# Patient Record
Sex: MALE | Race: WHITE | NOT HISPANIC OR LATINO | Employment: OTHER | URBAN - METROPOLITAN AREA
[De-identification: names, ages, dates, MRNs, and addresses within clinical notes are randomized per-mention and may not be internally consistent; named-entity substitution may affect disease eponyms.]

---

## 2023-02-13 PROBLEM — G89.29 CHRONIC BILATERAL LOW BACK PAIN: Status: ACTIVE | Noted: 2023-02-13

## 2023-02-13 PROBLEM — M17.11 PRIMARY OSTEOARTHRITIS OF RIGHT KNEE: Status: ACTIVE | Noted: 2023-02-13

## 2023-02-13 PROBLEM — G89.29 CHRONIC RIGHT SHOULDER PAIN: Status: RESOLVED | Noted: 2023-02-13 | Resolved: 2023-02-13

## 2023-02-13 PROBLEM — M17.12 PRIMARY OSTEOARTHRITIS OF LEFT KNEE: Status: RESOLVED | Noted: 2023-02-13 | Resolved: 2023-02-13

## 2023-02-13 PROBLEM — E78.5 BORDERLINE HYPERLIPIDEMIA: Status: ACTIVE | Noted: 2023-02-13

## 2023-02-13 PROBLEM — E53.8 VITAMIN B 12 DEFICIENCY: Status: ACTIVE | Noted: 2023-02-13

## 2023-02-13 PROBLEM — M76.899 ENTHESOPATHY OF HIP REGION: Status: ACTIVE | Noted: 2023-02-13

## 2023-02-13 PROBLEM — M16.0 PRIMARY OSTEOARTHRITIS OF BOTH HIPS: Status: ACTIVE | Noted: 2023-02-13

## 2023-02-13 PROBLEM — M54.50 CHRONIC BILATERAL LOW BACK PAIN: Status: ACTIVE | Noted: 2023-02-13

## 2023-02-13 PROBLEM — R10.31 RIGHT GROIN PAIN: Status: ACTIVE | Noted: 2023-02-13

## 2023-02-13 PROBLEM — I10 HYPERTENSION: Status: ACTIVE | Noted: 2023-02-13

## 2023-02-13 PROBLEM — M25.511 CHRONIC RIGHT SHOULDER PAIN: Status: RESOLVED | Noted: 2023-02-13 | Resolved: 2023-02-13

## 2023-02-13 PROBLEM — R73.03 PREDIABETES: Status: ACTIVE | Noted: 2023-02-13

## 2023-04-04 ENCOUNTER — OFFICE VISIT (OUTPATIENT)
Dept: PRIMARY CARE | Facility: CLINIC | Age: 78
End: 2023-04-04
Payer: MEDICARE

## 2023-04-04 VITALS
HEART RATE: 55 BPM | DIASTOLIC BLOOD PRESSURE: 66 MMHG | TEMPERATURE: 96.8 F | WEIGHT: 246 LBS | HEIGHT: 69 IN | OXYGEN SATURATION: 98 % | BODY MASS INDEX: 36.43 KG/M2 | SYSTOLIC BLOOD PRESSURE: 102 MMHG

## 2023-04-04 DIAGNOSIS — E66.01 CLASS 2 SEVERE OBESITY DUE TO EXCESS CALORIES WITH SERIOUS COMORBIDITY AND BODY MASS INDEX (BMI) OF 36.0 TO 36.9 IN ADULT (MULTI): ICD-10-CM

## 2023-04-04 DIAGNOSIS — Z00.00 MEDICARE ANNUAL WELLNESS VISIT, SUBSEQUENT: Primary | ICD-10-CM

## 2023-04-04 DIAGNOSIS — E78.5 BORDERLINE HYPERLIPIDEMIA: ICD-10-CM

## 2023-04-04 DIAGNOSIS — I10 PRIMARY HYPERTENSION: ICD-10-CM

## 2023-04-04 DIAGNOSIS — R73.03 PREDIABETES: ICD-10-CM

## 2023-04-04 DIAGNOSIS — M17.11 PRIMARY OSTEOARTHRITIS OF RIGHT KNEE: ICD-10-CM

## 2023-04-04 DIAGNOSIS — E53.8 VITAMIN B 12 DEFICIENCY: ICD-10-CM

## 2023-04-04 PROBLEM — E66.812 CLASS 2 SEVERE OBESITY DUE TO EXCESS CALORIES WITH SERIOUS COMORBIDITY AND BODY MASS INDEX (BMI) OF 36.0 TO 36.9 IN ADULT: Status: ACTIVE | Noted: 2023-04-04

## 2023-04-04 PROCEDURE — 1036F TOBACCO NON-USER: CPT | Performed by: FAMILY MEDICINE

## 2023-04-04 PROCEDURE — 1160F RVW MEDS BY RX/DR IN RCRD: CPT | Performed by: FAMILY MEDICINE

## 2023-04-04 PROCEDURE — 1170F FXNL STATUS ASSESSED: CPT | Performed by: FAMILY MEDICINE

## 2023-04-04 PROCEDURE — 1159F MED LIST DOCD IN RCRD: CPT | Performed by: FAMILY MEDICINE

## 2023-04-04 PROCEDURE — 99213 OFFICE O/P EST LOW 20 MIN: CPT | Performed by: FAMILY MEDICINE

## 2023-04-04 PROCEDURE — 3078F DIAST BP <80 MM HG: CPT | Performed by: FAMILY MEDICINE

## 2023-04-04 PROCEDURE — G0439 PPPS, SUBSEQ VISIT: HCPCS | Performed by: FAMILY MEDICINE

## 2023-04-04 PROCEDURE — 3074F SYST BP LT 130 MM HG: CPT | Performed by: FAMILY MEDICINE

## 2023-04-04 RX ORDER — RAMIPRIL 10 MG/1
10 CAPSULE ORAL DAILY
Qty: 90 CAPSULE | Refills: 3 | Status: SHIPPED | OUTPATIENT
Start: 2023-04-04 | End: 2024-02-22 | Stop reason: SDUPTHER

## 2023-04-04 RX ORDER — METOPROLOL TARTRATE 25 MG/1
25 TABLET, FILM COATED ORAL 2 TIMES DAILY
Qty: 180 TABLET | Refills: 3 | Status: SHIPPED | OUTPATIENT
Start: 2023-04-04 | End: 2023-10-02 | Stop reason: ALTCHOICE

## 2023-04-04 ASSESSMENT — ACTIVITIES OF DAILY LIVING (ADL)
DOING_HOUSEWORK: INDEPENDENT
TAKING_MEDICATION: INDEPENDENT
DRESSING: INDEPENDENT
MANAGING_FINANCES: INDEPENDENT
BATHING: INDEPENDENT
GROCERY_SHOPPING: INDEPENDENT

## 2023-04-04 ASSESSMENT — PATIENT HEALTH QUESTIONNAIRE - PHQ9
1. LITTLE INTEREST OR PLEASURE IN DOING THINGS: NOT AT ALL
SUM OF ALL RESPONSES TO PHQ9 QUESTIONS 1 AND 2: 0
2. FEELING DOWN, DEPRESSED OR HOPELESS: NOT AT ALL

## 2023-04-04 NOTE — PROGRESS NOTES
"Subjective   Reason for Visit: Abdirahman Mcdaneils is an 77 y.o. male here for a Medicare Wellness visit.     Past Medical, Surgical, and Family History reviewed and updated in chart.    Reviewed all medications by prescribing practitioner or clinical pharmacist (such as prescriptions, OTCs, herbal therapies and supplements) and documented in the medical record.    HPI  Reviewed chronic medical conditions    Patient Care Team:  Eduard Melchor DO as PCP - General  Eduard Melchor DO as PCP - [employer] PCP     Review of Systems    Objective   Vitals:  /66   Pulse 55   Temp 36 °C (96.8 °F)   Ht 1.753 m (5' 9\")   Wt 112 kg (246 lb)   SpO2 98%   BMI 36.33 kg/m²       Physical Exam  General: Patient is alert and oriented ×3 and appears in no acute distress. No respiratory distress. Obese    Head: Atraumatic normocephalic.    Eyes: EOMI, PERRLA      Ears: Canals patent without any irritation, tympanic membranes without inflammation, no swelling, normal light reflex.    Neck: Normal range of motion, no masses.  Thyroid is palpable and normal in size without any nodules. No anterior cervical or posterior cervical adenopathy.    Heart: Regular rate and rhythm, no murmurs clicks or gallops.  Dependent edema bilaterally    Lungs: Clear to auscultation bilaterally without any rhonchi rales or wheezing, lung sounds heard throughout all lung fields    Abdomen: Soft, nontender, no rigidity, rebound, guarding or organomegaly. Bowel sounds ×4 quadrants.    Musculoskeletal: Normal range of motion, strength is grossly intact in the proximal distal muscles of the upper and lower extremities bilaterally, deep tendon reflexes +2 out of 4 and symmetric bilaterally at the patella, Achilles, biceps, triceps, sensation intact.    Nerves: Cranial nerves II through XII appear grossly intact and without deficit    Skin: Intact, dry, no rashes or erythema    Psych: Normal affect.     Assessment/Plan   Problem List Items " Addressed This Visit       Borderline hyperlipidemia    Hypertension    Relevant Medications    metoprolol tartrate (Lopressor) 25 mg tablet    ramipril (Altace) 10 mg capsule    Prediabetes    Primary osteoarthritis of right knee    Vitamin B 12 deficiency    Medicare annual wellness visit, subsequent - Primary    Class 2 severe obesity due to excess calories with serious comorbidity and body mass index (BMI) of 36.0 to 36.9 in adult (CMS/[facility])     Reviewed in for the patient's past medical history, surgical history, family history, social history  Depression screening was done in the office today using PHQ-2  We reviewed the patient's activities of daily living and possible risk for falling. Patient is stable.  Discussed preventative screenings  Vaccinations were discussed in the office. We did review the patient's status on influenza vaccination, pneumonia vaccination, tetanus vaccination, and vaccinations are up-to-date   Patient was instructed to follow-up with dentist regularly and also with eye doctor regularly  We discussed advanced directives including power of , living well and DO NOT RESUSCITATE orders    Essential hypertension-currently controlled. continue on decreased metoprolol 25 mg 1 by mouth twice a day and ramipril 10 mg 1 by mouth daily.    Hyperlipidemia-currently controlled. continue on pravastatin 80 mg daily    Prediabetes-suggested diet and exercise at this point in time. Repeat sugars in 6 months    Osteoarthritis of the hips bilaterally-patient has seen Dr. Duenas the orthopedist.  Does not want knee replacements    Vitamin B-12 deficiency-the patient is taking B-12 regularly.    Morbid obesity-discussed diet and exercise with the patient. Suggested that he start regular exercise program. Not execising     **Patient Discussion/Summary    Risk Factors Identified During Visit: Weight: BMI < 18.5 or > 25.   Weight Concerns: provided diet and exercise counseling.   Influenza: influenza  vaccine was previously given.   Pneumovax 23: Pneumovax 23 vaccine was given  Prevnar 13: Prevnar 13 vaccine was previously given.   Shingles Vaccine: Shingles vaccine was previously given.   Prostate cancer screening: screening not indicated.   Colorectal Cancer Screening: screening not indicated.   Abdominal Aortic Aneurysm screening: screening not indicated.   HIV screening: screening not indicated.   Hip pain left  Continue taking celecoxib 200 mg 1 capsule twice daily and start taking Tylenol 500-1,000 mg up to 3 times a day. Just make sure to stay under 3000 mg daily.  Can go to Dr Pierson if no improvement

## 2023-04-17 ENCOUNTER — TELEPHONE (OUTPATIENT)
Dept: PRIMARY CARE | Facility: CLINIC | Age: 78
End: 2023-04-17
Payer: MEDICARE

## 2023-04-17 DIAGNOSIS — I10 PRIMARY HYPERTENSION: Primary | ICD-10-CM

## 2023-04-17 NOTE — TELEPHONE ENCOUNTER
Pt let message that since you have made med changed he has swelling in his feet and ankles. Asking for your suggestions

## 2023-04-18 RX ORDER — METOPROLOL TARTRATE 50 MG/1
50 TABLET ORAL 2 TIMES DAILY
Qty: 180 TABLET | Refills: 3 | Status: SHIPPED | OUTPATIENT
Start: 2023-04-18 | End: 2024-04-17

## 2023-04-18 NOTE — TELEPHONE ENCOUNTER
Called Abdirahman and he said ever since you changed Metroprolol from 50% to 25%  BID that his feet started swelling. He is good with everything else no other symptoms .

## 2023-04-19 NOTE — TELEPHONE ENCOUNTER
Called Abdirahman and left another message to see if he got my first message and asked for a call back

## 2023-05-01 ENCOUNTER — APPOINTMENT (OUTPATIENT)
Dept: PRIMARY CARE | Facility: CLINIC | Age: 78
End: 2023-05-01
Payer: MEDICARE

## 2023-05-09 ENCOUNTER — OFFICE VISIT (OUTPATIENT)
Dept: PRIMARY CARE | Facility: CLINIC | Age: 78
End: 2023-05-09
Payer: MEDICARE

## 2023-05-09 VITALS
BODY MASS INDEX: 36.29 KG/M2 | HEART RATE: 55 BPM | TEMPERATURE: 97.1 F | HEIGHT: 69 IN | DIASTOLIC BLOOD PRESSURE: 60 MMHG | SYSTOLIC BLOOD PRESSURE: 108 MMHG | OXYGEN SATURATION: 96 % | WEIGHT: 245 LBS

## 2023-05-09 DIAGNOSIS — R06.09 DYSPNEA ON EXERTION: ICD-10-CM

## 2023-05-09 DIAGNOSIS — R60.0 BILATERAL LOWER EXTREMITY EDEMA: Primary | ICD-10-CM

## 2023-05-09 DIAGNOSIS — S81.802A OPEN WOUND OF LEFT LOWER EXTREMITY, INITIAL ENCOUNTER: ICD-10-CM

## 2023-05-09 DIAGNOSIS — I87.2 VENOUS STASIS DERMATITIS OF BOTH LOWER EXTREMITIES: ICD-10-CM

## 2023-05-09 PROCEDURE — 3078F DIAST BP <80 MM HG: CPT | Performed by: FAMILY MEDICINE

## 2023-05-09 PROCEDURE — 99214 OFFICE O/P EST MOD 30 MIN: CPT | Performed by: FAMILY MEDICINE

## 2023-05-09 PROCEDURE — 3074F SYST BP LT 130 MM HG: CPT | Performed by: FAMILY MEDICINE

## 2023-05-09 PROCEDURE — 1159F MED LIST DOCD IN RCRD: CPT | Performed by: FAMILY MEDICINE

## 2023-05-09 PROCEDURE — 1160F RVW MEDS BY RX/DR IN RCRD: CPT | Performed by: FAMILY MEDICINE

## 2023-05-09 PROCEDURE — 1036F TOBACCO NON-USER: CPT | Performed by: FAMILY MEDICINE

## 2023-05-09 ASSESSMENT — PATIENT HEALTH QUESTIONNAIRE - PHQ9
2. FEELING DOWN, DEPRESSED OR HOPELESS: NOT AT ALL
SUM OF ALL RESPONSES TO PHQ9 QUESTIONS 1 AND 2: 0
1. LITTLE INTEREST OR PLEASURE IN DOING THINGS: NOT AT ALL

## 2023-05-09 NOTE — PROGRESS NOTES
Subjective   Patient ID: Abdirahman Mcdaniels is a 77 y.o. male who presents for Foot Swelling (Feet swelling for at least couple months. Has a puncture wound on back of left leg. ).  HPI      Patient has  2 lat 1 x 1  1x 1.25 l3ft leg.  At least 1 year has had the cuts on the legs.  Hit on a car door.      Having swelling andnvaricose veins and changes in coloration.    Swelling for the past couple months  Denies SOB, Denies CP.     Eyes are getting matted up.    No fatigue or tired        Review of Systems    Objective   Physical Exam  General: Patient is alert and orient x3 and appears in no acute distress.  Morbidly obese.    Eyes: EOMI, PERRLA    Neck: No JVD, normal range of motion, no adenopathy    Heart: Regular rate and rhythm no murmurs clicks or gallops    Lungs: Clear to auscultation bilaterally without rhonchi rales or wheezing    Extremities: Pitting edema plus 2 out of 4, discoloration of the lower extremities dusky in color, are not erythematous, no drainage, granulation tissue is present, no bleeding      Assessment/Plan   Problem List Items Addressed This Visit    None  Bilateral lower extremity edema  - Ordering echocardiogram since the patient also does have some mild shortness of breath with exertion  -Labs ordered  - It looks like the patient is dealing with venous stasis disease.  - Discussed using compression stockings  -Instructed to eat low-salt diet which the patient is already doing    Open wounds left lower extremity laterally  - Sent to wound care.  Not healing because of venous stasis disease.  Does not appear to be infected at this time.

## 2023-06-12 DIAGNOSIS — E78.5 BORDERLINE HYPERLIPIDEMIA: ICD-10-CM

## 2023-06-12 DIAGNOSIS — I10 PRIMARY HYPERTENSION: Primary | ICD-10-CM

## 2023-06-12 RX ORDER — PRAVASTATIN SODIUM 80 MG/1
80 TABLET ORAL NIGHTLY
Qty: 90 TABLET | Refills: 3 | Status: SHIPPED | OUTPATIENT
Start: 2023-06-12 | End: 2023-07-13 | Stop reason: SDUPTHER

## 2023-07-13 DIAGNOSIS — E78.5 BORDERLINE HYPERLIPIDEMIA: ICD-10-CM

## 2023-07-13 DIAGNOSIS — I10 PRIMARY HYPERTENSION: ICD-10-CM

## 2023-07-13 RX ORDER — PRAVASTATIN SODIUM 80 MG/1
80 TABLET ORAL NIGHTLY
Qty: 90 TABLET | Refills: 3 | Status: SHIPPED | OUTPATIENT
Start: 2023-07-13 | End: 2023-08-02 | Stop reason: SDUPTHER

## 2023-08-02 DIAGNOSIS — I10 PRIMARY HYPERTENSION: ICD-10-CM

## 2023-08-02 DIAGNOSIS — E78.5 BORDERLINE HYPERLIPIDEMIA: ICD-10-CM

## 2023-08-02 RX ORDER — PRAVASTATIN SODIUM 80 MG/1
80 TABLET ORAL NIGHTLY
Qty: 90 TABLET | Refills: 3 | Status: SHIPPED | OUTPATIENT
Start: 2023-08-02 | End: 2024-08-01

## 2023-08-16 DIAGNOSIS — M17.11 PRIMARY OSTEOARTHRITIS OF RIGHT KNEE: Primary | ICD-10-CM

## 2023-08-16 RX ORDER — CELECOXIB 200 MG/1
200 CAPSULE ORAL 2 TIMES DAILY
Qty: 180 CAPSULE | Refills: 3 | Status: SHIPPED | OUTPATIENT
Start: 2023-08-16 | End: 2024-08-15

## 2023-09-14 ENCOUNTER — TELEPHONE (OUTPATIENT)
Dept: PRIMARY CARE | Facility: CLINIC | Age: 78
End: 2023-09-14
Payer: MEDICARE

## 2023-10-02 ENCOUNTER — OFFICE VISIT (OUTPATIENT)
Dept: PRIMARY CARE | Facility: CLINIC | Age: 78
End: 2023-10-02
Payer: MEDICARE

## 2023-10-02 VITALS
HEIGHT: 69 IN | OXYGEN SATURATION: 98 % | WEIGHT: 245 LBS | DIASTOLIC BLOOD PRESSURE: 76 MMHG | HEART RATE: 72 BPM | SYSTOLIC BLOOD PRESSURE: 112 MMHG | BODY MASS INDEX: 36.29 KG/M2 | TEMPERATURE: 97.2 F

## 2023-10-02 DIAGNOSIS — I10 PRIMARY HYPERTENSION: ICD-10-CM

## 2023-10-02 DIAGNOSIS — R73.03 PREDIABETES: ICD-10-CM

## 2023-10-02 DIAGNOSIS — E53.8 VITAMIN B 12 DEFICIENCY: ICD-10-CM

## 2023-10-02 DIAGNOSIS — E66.01 CLASS 2 SEVERE OBESITY DUE TO EXCESS CALORIES WITH SERIOUS COMORBIDITY AND BODY MASS INDEX (BMI) OF 36.0 TO 36.9 IN ADULT (MULTI): ICD-10-CM

## 2023-10-02 DIAGNOSIS — Z00.00 ANNUAL PHYSICAL EXAM: Primary | ICD-10-CM

## 2023-10-02 DIAGNOSIS — E78.5 BORDERLINE HYPERLIPIDEMIA: ICD-10-CM

## 2023-10-02 PROCEDURE — 1160F RVW MEDS BY RX/DR IN RCRD: CPT | Performed by: FAMILY MEDICINE

## 2023-10-02 PROCEDURE — 3074F SYST BP LT 130 MM HG: CPT | Performed by: FAMILY MEDICINE

## 2023-10-02 PROCEDURE — G0008 ADMIN INFLUENZA VIRUS VAC: HCPCS | Performed by: FAMILY MEDICINE

## 2023-10-02 PROCEDURE — 99214 OFFICE O/P EST MOD 30 MIN: CPT | Performed by: FAMILY MEDICINE

## 2023-10-02 PROCEDURE — 90662 IIV NO PRSV INCREASED AG IM: CPT | Performed by: FAMILY MEDICINE

## 2023-10-02 PROCEDURE — 1036F TOBACCO NON-USER: CPT | Performed by: FAMILY MEDICINE

## 2023-10-02 PROCEDURE — 99397 PER PM REEVAL EST PAT 65+ YR: CPT | Performed by: FAMILY MEDICINE

## 2023-10-02 PROCEDURE — 1159F MED LIST DOCD IN RCRD: CPT | Performed by: FAMILY MEDICINE

## 2023-10-02 PROCEDURE — 3078F DIAST BP <80 MM HG: CPT | Performed by: FAMILY MEDICINE

## 2023-10-02 ASSESSMENT — PATIENT HEALTH QUESTIONNAIRE - PHQ9
2. FEELING DOWN, DEPRESSED OR HOPELESS: NOT AT ALL
1. LITTLE INTEREST OR PLEASURE IN DOING THINGS: NOT AT ALL
SUM OF ALL RESPONSES TO PHQ9 QUESTIONS 1 AND 2: 0

## 2023-10-02 NOTE — PROGRESS NOTES
"Subjective   Reason for Visit: Abdirahman Mcdaniels is an 77 y.o. male here for a Annual physical and to review chronic illnesses  Need flu vaccine and HBA1C    Past Medical, Surgical, and Family History reviewed and updated in chart.    Reviewed all medications by prescribing practitioner or clinical pharmacist (such as prescriptions, OTCs, herbal therapies and supplements) and documented in the medical record.    HPI  Reviewed chronic medical conditions    Diet: He is not following any paticular exercise  Exercise: trying to walk some  Sleep:  He is sleeping well  Denies urinary difficulty    Dental: No issues and regular  Vision:  No problems   Hearing is decreased some    Saw Dr Lentz saw him.  Still following with wound care and for toe nails.    He is wearing compression stockings.    Patient Care Team:  Eduard Melchor DO as PCP - General  Eduard Melchor DO as PCP - [employer] PCP     Review of Systems    Objective   Vitals:  /76   Pulse 72   Temp 36.2 °C (97.2 °F)   Ht 1.753 m (5' 9\")   Wt 111 kg (245 lb)   SpO2 98%   BMI 36.18 kg/m²       Physical Exam  General: Patient is alert and oriented ×3 and appears in no acute distress. No respiratory distress. Obese    Head: Atraumatic normocephalic.    Eyes: EOMI, PERRLA      Ears: Canals patent without any irritation, tympanic membranes without inflammation, no swelling, normal light reflex.    Neck: Normal range of motion, no masses.  Thyroid is palpable and normal in size without any nodules. No anterior cervical or posterior cervical adenopathy.    Heart: Regular rate and rhythm, no murmurs clicks or gallops.  Dependent edema bilaterally    Lungs: Clear to auscultation bilaterally without any rhonchi rales or wheezing, lung sounds heard throughout all lung fields    Abdomen: Soft, nontender, no rigidity, rebound, guarding or organomegaly. Bowel sounds ×4 quadrants.    Musculoskeletal: Normal range of motion, strength is grossly intact in " the proximal distal muscles of the upper and lower extremities bilaterally, deep tendon reflexes +2 out of 4 and symmetric bilaterally at the patella, Achilles, biceps, triceps, sensation intact.    Nerves: Cranial nerves II through XII appear grossly intact and without deficit    Skin: Intact, dry, no rashes or erythema    Psych: Normal affect.     Assessment/Plan   Problem List Items Addressed This Visit       Borderline hyperlipidemia    Hypertension    Prediabetes    Vitamin B 12 deficiency    Class 2 severe obesity due to excess calories with serious comorbidity and body mass index (BMI) of 36.0 to 36.9 in adult (CMS/[facility])     Other Visit Diagnoses       Annual physical exam    -  Primary        Reviewed in for the patient's past medical history, surgical history, family history, social history  Depression screening was done in the office today using PHQ-2  We reviewed the patient's activities of daily living and possible risk for falling. Patient is stable.  Discussed preventative screenings  Vaccinations were discussed in the office. We did review the patient's status on influenza vaccination, pneumonia vaccination, tetanus vaccination, and vaccinations are up-to-date   Patient was instructed to follow-up with dentist regularly and also with eye doctor regularly  We discussed advanced directives including power of , living well and DO NOT RESUSCITATE orders    Essential hypertension-currently controlled. continue on decreased metoprolol 50 mg 1 by mouth twice a day and ramipril 10 mg 1 by mouth daily.    Hyperlipidemia-currently controlled. continue on pravastatin 80 mg daily    Prediabetes- Repeat sugars in 6 months    Osteoarthritis of the hips bilaterally-patient has seen Dr. Duenas the orthopedist.  Does not want knee replacements    Vitamin B-12 deficiency-the patient is taking B-12 regularly.    Morbid obesity-discussed diet and exercise with the patient. Suggested that he start regular exercise  program. Not exercising    Had been experiencing loss of several family members.  He is doing well          **Patient Discussion/Summary    Risk Factors Identified During Visit: Weight: BMI < 18.5 or > 25.   Weight Concerns: provided diet and exercise counseling.   Influenza: influenza vaccine given  Pneumovax 23: Pneumovax 23 vaccine was given  Prevnar 13: Prevnar 13 vaccine was previously given.   Shingles Vaccine: Shingles vaccine was previously given.   Prostate cancer screening: screening not indicated.   Colorectal Cancer Screening: screening not indicated.   Abdominal Aortic Aneurysm screening: screening not indicated.   HIV screening: screening not indicated.

## 2023-10-23 ENCOUNTER — APPOINTMENT (OUTPATIENT)
Dept: PRIMARY CARE | Facility: CLINIC | Age: 78
End: 2023-10-23
Payer: MEDICARE

## 2024-02-22 DIAGNOSIS — I10 PRIMARY HYPERTENSION: ICD-10-CM

## 2024-02-23 RX ORDER — RAMIPRIL 10 MG/1
10 CAPSULE ORAL DAILY
Qty: 90 CAPSULE | Refills: 3 | Status: SHIPPED | OUTPATIENT
Start: 2024-02-23 | End: 2025-02-22

## 2024-03-20 ENCOUNTER — TELEPHONE (OUTPATIENT)
Dept: PRIMARY CARE | Facility: CLINIC | Age: 79
End: 2024-03-20
Payer: MEDICARE

## 2024-03-20 NOTE — TELEPHONE ENCOUNTER
Abdirahman is coming in April 1. He said he is supposed to have lab work done. There are a few in his chart but he said something about one done for his heart. Can you order that one too please. Then I will mail them out to him.

## 2024-04-01 ENCOUNTER — OFFICE VISIT (OUTPATIENT)
Dept: PRIMARY CARE | Facility: CLINIC | Age: 79
End: 2024-04-01
Payer: MEDICARE

## 2024-04-01 VITALS
TEMPERATURE: 97.4 F | HEART RATE: 53 BPM | DIASTOLIC BLOOD PRESSURE: 64 MMHG | BODY MASS INDEX: 37.47 KG/M2 | OXYGEN SATURATION: 97 % | WEIGHT: 253 LBS | SYSTOLIC BLOOD PRESSURE: 116 MMHG | HEIGHT: 69 IN

## 2024-04-01 DIAGNOSIS — E53.8 VITAMIN B 12 DEFICIENCY: ICD-10-CM

## 2024-04-01 DIAGNOSIS — R60.0 BILATERAL LOWER EXTREMITY EDEMA: ICD-10-CM

## 2024-04-01 DIAGNOSIS — E66.01 CLASS 2 SEVERE OBESITY DUE TO EXCESS CALORIES WITH SERIOUS COMORBIDITY AND BODY MASS INDEX (BMI) OF 36.0 TO 36.9 IN ADULT (MULTI): ICD-10-CM

## 2024-04-01 DIAGNOSIS — R73.03 PREDIABETES: ICD-10-CM

## 2024-04-01 DIAGNOSIS — I10 PRIMARY HYPERTENSION: ICD-10-CM

## 2024-04-01 DIAGNOSIS — Z00.00 MEDICARE ANNUAL WELLNESS VISIT, SUBSEQUENT: Primary | ICD-10-CM

## 2024-04-01 DIAGNOSIS — E78.5 BORDERLINE HYPERLIPIDEMIA: ICD-10-CM

## 2024-04-01 PROCEDURE — 1158F ADVNC CARE PLAN TLK DOCD: CPT | Performed by: FAMILY MEDICINE

## 2024-04-01 PROCEDURE — 1123F ACP DISCUSS/DSCN MKR DOCD: CPT | Performed by: FAMILY MEDICINE

## 2024-04-01 PROCEDURE — G0439 PPPS, SUBSEQ VISIT: HCPCS | Performed by: FAMILY MEDICINE

## 2024-04-01 PROCEDURE — 99214 OFFICE O/P EST MOD 30 MIN: CPT | Performed by: FAMILY MEDICINE

## 2024-04-01 PROCEDURE — 3074F SYST BP LT 130 MM HG: CPT | Performed by: FAMILY MEDICINE

## 2024-04-01 PROCEDURE — 1159F MED LIST DOCD IN RCRD: CPT | Performed by: FAMILY MEDICINE

## 2024-04-01 PROCEDURE — 1170F FXNL STATUS ASSESSED: CPT | Performed by: FAMILY MEDICINE

## 2024-04-01 PROCEDURE — 3078F DIAST BP <80 MM HG: CPT | Performed by: FAMILY MEDICINE

## 2024-04-01 RX ORDER — FUROSEMIDE 20 MG/1
20 TABLET ORAL DAILY
Qty: 30 TABLET | Refills: 11 | Status: SHIPPED | OUTPATIENT
Start: 2024-04-01 | End: 2025-04-01

## 2024-04-01 RX ORDER — DOXYCYCLINE HYCLATE 100 MG
TABLET ORAL
COMMUNITY
Start: 2024-03-25

## 2024-04-01 ASSESSMENT — ACTIVITIES OF DAILY LIVING (ADL)
TAKING_MEDICATION: INDEPENDENT
MANAGING_FINANCES: NEEDS ASSISTANCE
GROCERY_SHOPPING: INDEPENDENT
DRESSING: INDEPENDENT
BATHING: INDEPENDENT
DOING_HOUSEWORK: NEEDS ASSISTANCE

## 2024-04-01 ASSESSMENT — PATIENT HEALTH QUESTIONNAIRE - PHQ9
1. LITTLE INTEREST OR PLEASURE IN DOING THINGS: NOT AT ALL
2. FEELING DOWN, DEPRESSED OR HOPELESS: NOT AT ALL
SUM OF ALL RESPONSES TO PHQ9 QUESTIONS 1 AND 2: 0

## 2024-04-01 NOTE — PROGRESS NOTES
"Subjective   Reason for Visit: Abdirahman Mcdaniels is an 78 y.o. male here for a Medicare exam and to review chronic illnesses                HPI  Reviewed chronic medical conditions    He is having issues with having swelling/edema in the lower extremities which we have evaluate.  Worsened in October.      Diet: He is not following any paticular exercise  Exercise: trying to walk some  Sleep:  He is sleeping well  Denies urinary difficulty    Dental: No issues and regular  Vision:  No problems   Hearing is decreased some    Saw Dr Lentz saw him.  Still following with wound care and for toe nails.    He is wearing compression stockings.    Patient Care Team:  Eduard Melchor DO as PCP - General  Eduard Melchor DO as PCP - [employer] PCP     Review of Systems    Objective   Vitals:  /64 (BP Location: Left arm, Patient Position: Sitting, BP Cuff Size: Adult)   Pulse 53   Temp 36.3 °C (97.4 °F)   Ht 1.753 m (5' 9\")   Wt 115 kg (253 lb)   SpO2 97%   BMI 37.36 kg/m²       Physical Exam  General: Patient is alert and oriented ×3 and appears in no acute distress. No respiratory distress. Obese    Head: Atraumatic normocephalic.    Eyes: EOMI, PERRLA      Ears: Canals patent without any irritation, tympanic membranes without inflammation, no swelling, normal light reflex.    Neck: Normal range of motion, no masses.  Thyroid is palpable and normal in size without any nodules. No anterior cervical or posterior cervical adenopathy.    Heart: Regular rate and rhythm, no murmurs clicks or gallops.  Dependent edema bilaterally    Lungs: Clear to auscultation bilaterally without any rhonchi rales or wheezing, lung sounds heard throughout all lung fields    Abdomen: Soft, nontender, no rigidity, rebound, guarding or organomegaly. Bowel sounds ×4 quadrants.    Musculoskeletal: Normal range of motion, strength is grossly intact in the proximal distal muscles of the upper and lower extremities bilaterally, deep " tendon reflexes +2 out of 4 and symmetric bilaterally at the patella, Achilles, biceps, triceps, sensation intact.    Nerves: Cranial nerves II through XII appear grossly intact and without deficit    Skin: Intact, dry, no rashes or erythema.  Wound is healing    Psych: Normal affect.     Assessment/Plan   Problem List Items Addressed This Visit       Borderline hyperlipidemia    Hypertension    Prediabetes    Vitamin B 12 deficiency    Medicare annual wellness visit, subsequent - Primary    Class 2 severe obesity due to excess calories with serious comorbidity and body mass index (BMI) of 36.0 to 36.9 in adult (Multi)     Other Visit Diagnoses       Bilateral lower extremity edema        Relevant Medications    furosemide (Lasix) 20 mg tablet        Reviewed in for the patient's past medical history, surgical history, family history, social history  Depression screening was done in the office today using PHQ-2  We reviewed the patient's activities of daily living and possible risk for falling. Patient is stable.  Discussed preventative screenings  Vaccinations were discussed in the office. We did review the patient's status on influenza vaccination, pneumonia vaccination, tetanus vaccination, and vaccinations are up-to-date   Patient was instructed to follow-up with dentist regularly and also with eye doctor regularly  We discussed advanced directives including power of , living well and DO NOT RESUSCITATE orders    Essential hypertension-currently controlled. continue on decreased metoprolol 50 mg 1 by mouth twice a day and ramipril 10 mg 1 by mouth daily.    Hyperlipidemia-currently controlled. continue on pravastatin 80 mg daily    Prediabetes- Repeat sugars in 6 months.  HBA1C was 6.1    Osteoarthritis of the hips bilaterally-patient has seen Dr. Duenas the orthopedist.  Does not want knee replacements    Vitamin B-12 deficiency-the patient is taking B-12 regularly.    Morbid obesity-discussed diet and  exercise with the patient. Suggested that he start regular exercise program. Not exercising    Peripheral edema/ SOB with exertion with walking at the grocery store. - Start Lasix  - Echocardiogram   - Chest xray  - Start Lasix 20 mg daily     **Patient Discussion/Summary    Risk Factors Identified During Visit: Weight: BMI < 18.5 or > 25.   Weight Concerns: provided diet and exercise counseling.   Influenza: influenza vaccine given  Pneumovax 23: Pneumovax 23 vaccine was given  Prevnar 13: Prevnar 13 vaccine was previously given.   Shingles Vaccine: Shingles vaccine was previously given.   Prostate cancer screening: screening not indicated.   Colorectal Cancer Screening: screening not indicated.   Abdominal Aortic Aneurysm screening: screening not indicated.   HIV screening: screening not indicated.

## 2024-08-07 DIAGNOSIS — I10 PRIMARY HYPERTENSION: ICD-10-CM

## 2024-08-07 DIAGNOSIS — E78.5 BORDERLINE HYPERLIPIDEMIA: ICD-10-CM

## 2024-08-07 RX ORDER — PRAVASTATIN SODIUM 80 MG/1
80 TABLET ORAL NIGHTLY
Qty: 90 TABLET | Refills: 0 | Status: SHIPPED | OUTPATIENT
Start: 2024-08-07

## 2024-10-07 ENCOUNTER — APPOINTMENT (OUTPATIENT)
Dept: PRIMARY CARE | Facility: CLINIC | Age: 79
End: 2024-10-07
Payer: MEDICARE

## 2024-10-09 ENCOUNTER — APPOINTMENT (OUTPATIENT)
Dept: PRIMARY CARE | Facility: CLINIC | Age: 79
End: 2024-10-09
Payer: MEDICARE

## 2024-10-09 VITALS
HEIGHT: 69 IN | OXYGEN SATURATION: 97 % | RESPIRATION RATE: 18 BRPM | BODY MASS INDEX: 38.36 KG/M2 | DIASTOLIC BLOOD PRESSURE: 82 MMHG | WEIGHT: 259 LBS | SYSTOLIC BLOOD PRESSURE: 128 MMHG | HEART RATE: 68 BPM

## 2024-10-09 DIAGNOSIS — Z23 ENCOUNTER FOR IMMUNIZATION: ICD-10-CM

## 2024-10-09 DIAGNOSIS — Z00.00 ANNUAL PHYSICAL EXAM: Primary | ICD-10-CM

## 2024-10-09 DIAGNOSIS — E66.812 CLASS 2 SEVERE OBESITY DUE TO EXCESS CALORIES WITH SERIOUS COMORBIDITY AND BODY MASS INDEX (BMI) OF 36.0 TO 36.9 IN ADULT: ICD-10-CM

## 2024-10-09 DIAGNOSIS — I10 PRIMARY HYPERTENSION: ICD-10-CM

## 2024-10-09 DIAGNOSIS — E78.5 BORDERLINE HYPERLIPIDEMIA: ICD-10-CM

## 2024-10-09 DIAGNOSIS — E11.9 TYPE 2 DIABETES MELLITUS WITHOUT COMPLICATION, WITHOUT LONG-TERM CURRENT USE OF INSULIN (MULTI): ICD-10-CM

## 2024-10-09 DIAGNOSIS — R73.03 PREDIABETES: ICD-10-CM

## 2024-10-09 DIAGNOSIS — R06.09 DYSPNEA ON EXERTION: ICD-10-CM

## 2024-10-09 DIAGNOSIS — E53.8 VITAMIN B 12 DEFICIENCY: ICD-10-CM

## 2024-10-09 DIAGNOSIS — E66.01 CLASS 2 SEVERE OBESITY DUE TO EXCESS CALORIES WITH SERIOUS COMORBIDITY AND BODY MASS INDEX (BMI) OF 36.0 TO 36.9 IN ADULT: ICD-10-CM

## 2024-10-09 PROBLEM — E66.9 OBESITY WITH BODY MASS INDEX 30 OR GREATER: Status: ACTIVE | Noted: 2024-10-09

## 2024-10-09 PROBLEM — L97.922: Status: ACTIVE | Noted: 2024-02-16

## 2024-10-09 PROBLEM — I20.9 ANGINA PECTORIS: Status: ACTIVE | Noted: 2024-10-09

## 2024-10-09 PROBLEM — L03.116 CELLULITIS OF LEFT LEG: Status: ACTIVE | Noted: 2024-08-16

## 2024-10-09 PROBLEM — Z87.39 HISTORY OF BACK PROBLEMS: Status: ACTIVE | Noted: 2024-10-09

## 2024-10-09 PROBLEM — R60.0 LOCALIZED EDEMA: Status: ACTIVE | Noted: 2024-02-16

## 2024-10-09 PROBLEM — E44.0 MODERATE PROTEIN-CALORIE MALNUTRITION (MULTI): Status: ACTIVE | Noted: 2024-10-09

## 2024-10-09 PROCEDURE — G0008 ADMIN INFLUENZA VIRUS VAC: HCPCS | Performed by: FAMILY MEDICINE

## 2024-10-09 PROCEDURE — 90662 IIV NO PRSV INCREASED AG IM: CPT | Performed by: FAMILY MEDICINE

## 2024-10-09 PROCEDURE — 99214 OFFICE O/P EST MOD 30 MIN: CPT | Performed by: FAMILY MEDICINE

## 2024-10-09 PROCEDURE — 3079F DIAST BP 80-89 MM HG: CPT | Performed by: FAMILY MEDICINE

## 2024-10-09 PROCEDURE — 99397 PER PM REEVAL EST PAT 65+ YR: CPT | Performed by: FAMILY MEDICINE

## 2024-10-09 PROCEDURE — 1123F ACP DISCUSS/DSCN MKR DOCD: CPT | Performed by: FAMILY MEDICINE

## 2024-10-09 PROCEDURE — 3074F SYST BP LT 130 MM HG: CPT | Performed by: FAMILY MEDICINE

## 2024-10-09 PROCEDURE — 1159F MED LIST DOCD IN RCRD: CPT | Performed by: FAMILY MEDICINE

## 2024-10-09 NOTE — PROGRESS NOTES
"Subjective   Reason for Visit: Abdirahman Mcdaniels is an 78 y.o. male here for a annual  physical exam and to review chronic illnesses           Reviewed all medications by prescribing practitioner or clinical pharmacist (such as prescriptions, OTCs, herbal therapies and supplements) and documented in the medical record.    HPI  Reviewed chronic medical conditions    He is having issues with having swelling/edema in the lower extremities which we have evaluate.  Worsened in October.      Diet: He is not following any paticular exercise.    Exercise: trying to walk some  Sleep:  He is sleeping well  Denies urinary difficulty    Dental: No issues and regular  Vision:  No problems   Hearing is decreased some    Saw Dr Lentz saw him.    He is wearing compression stockings.    [facility]  Having them wrapped.   Furosemide has helped significantly   Patient Care Team:  Eduard Melchor DO as PCP - General  Eduard Melchor DO as PCP - [employer] PCP     Review of Systems  Deneis chest pain, orthopnea.  SOB.  He is slowq going up steps.   Objective   Vitals:  /82 (BP Location: Right arm, Patient Position: Sitting, BP Cuff Size: Large adult)   Pulse 68   Resp 18   Ht 1.753 m (5' 9\")   Wt 117 kg (259 lb)   SpO2 97%   BMI 38.25 kg/m²       Physical Exam  General: Patient is alert and oriented ×3 and appears in no acute distress. No respiratory distress. Obese    Head: Atraumatic normocephalic.    Eyes: EOMI, PERRLA      Ears: Canals patent without any irritation, tympanic membranes without inflammation, no swelling, normal light reflex.    Neck: Normal range of motion, no masses.  Thyroid is palpable and normal in size without any nodules. No anterior cervical or posterior cervical adenopathy.    Heart: Regular rate and rhythm, no murmurs clicks or gallops.  Dependent edema bilaterally    Lungs: Clear to auscultation bilaterally without any rhonchi rales or " wheezing, lung sounds heard throughout all lung fields    Abdomen: Soft, nontender, no rigidity, rebound, guarding or organomegaly. Bowel sounds ×4 quadrants.    Musculoskeletal: Normal range of motion, strength is grossly intact in the proximal distal muscles of the upper and lower extremities bilaterally, deep tendon reflexes +2 out of 4 and symmetric bilaterally at the patella, Achilles, biceps, triceps, sensation intact.    Nerves: Cranial nerves II through XII appear grossly intact and without deficit    Skin: Intact, dry, no rashes or erythema.  Wound is healing    Psych: Normal affect.     Assessment/Plan   Problem List Items Addressed This Visit       Borderline hyperlipidemia    Relevant Orders    CBC and Auto Differential    Comprehensive Metabolic Panel    Lipid Panel    Vitamin B12    Hypertension    Relevant Orders    CBC and Auto Differential    Comprehensive Metabolic Panel    Lipid Panel    Vitamin B12    Prediabetes    Relevant Orders    CBC and Auto Differential    Comprehensive Metabolic Panel    Lipid Panel    Vitamin B12    Vitamin B 12 deficiency    Relevant Orders    CBC and Auto Differential    Comprehensive Metabolic Panel    Lipid Panel    Vitamin B12    Class 2 severe obesity due to excess calories with serious comorbidity and body mass index (BMI) of 36.0 to 36.9 in adult     Other Visit Diagnoses       Annual physical exam    -  Primary    Dyspnea on exertion        Relevant Orders    Transthoracic Echo Complete    Type 2 diabetes mellitus without complication, without long-term current use of insulin (Multi)        Relevant Orders    Hemoglobin A1C    Encounter for immunization            Depression screening was done in the office today using PHQ-2    Essential hypertension-currently controlled. continue on decreased metoprolol 50 mg 1 by mouth twice a day and ramipril 10 mg 1 by mouth daily.     Hyperlipidemia-currently controlled. continue on pravastatin 80 mg daily    Prediabetes-  Repeat sugars in 6 months.  HBA1C was 6.2.  Discussed diet and exercise    Osteoarthritis of the hips bilaterally-patient has seen Dr. Duenas the orthopedist.  Does not want knee replacements    Vitamin B-12 deficiency-the patient is taking B-12 regularly.    Morbid obesity-discussed diet and exercise with the patient. Suggested that he start regular exercise program. Not exercising.  Will try cutting out potatoes and changijng from white breads    Peripheral edema/ SOB with exertion with walking at the grocery store.  - Echocardiogram   - Lasix 20 mg daily     **Patient Discussion/Summary    Risk Factors Identified During Visit: Weight: BMI < 18.5 or > 25.   Weight Concerns: provided diet and exercise counseling.   Influenza: influenza vaccine given  Pneumovax 23: Pneumovax 23 vaccine was given  Prevnar 13: Prevnar 13 vaccine was previously given.   Shingles Vaccine: Shingles vaccine was previously given.   Prostate cancer screening: screening not indicated.   Colorectal Cancer Screening: screening not indicated.   Abdominal Aortic Aneurysm screening: screening not indicated.   HIV screening: screening not indicated.

## 2024-11-13 PROBLEM — I20.9 ANGINA PECTORIS: Status: RESOLVED | Noted: 2024-10-09 | Resolved: 2024-11-13

## 2024-11-19 DIAGNOSIS — I10 PRIMARY HYPERTENSION: ICD-10-CM

## 2024-11-19 RX ORDER — METOPROLOL TARTRATE 50 MG/1
50 TABLET ORAL 2 TIMES DAILY
Qty: 180 TABLET | Refills: 3 | Status: SHIPPED | OUTPATIENT
Start: 2024-11-19 | End: 2025-11-19

## 2024-12-09 DIAGNOSIS — E78.5 BORDERLINE HYPERLIPIDEMIA: ICD-10-CM

## 2024-12-09 DIAGNOSIS — I10 PRIMARY HYPERTENSION: ICD-10-CM

## 2024-12-09 RX ORDER — PRAVASTATIN SODIUM 80 MG/1
80 TABLET ORAL NIGHTLY
Qty: 90 TABLET | Refills: 3 | Status: SHIPPED | OUTPATIENT
Start: 2024-12-09

## 2025-02-10 DIAGNOSIS — R60.0 BILATERAL LOWER EXTREMITY EDEMA: ICD-10-CM

## 2025-02-12 RX ORDER — FUROSEMIDE 20 MG/1
20 TABLET ORAL DAILY
Qty: 90 TABLET | Refills: 3 | Status: SHIPPED | OUTPATIENT
Start: 2025-02-12

## 2025-02-13 ENCOUNTER — TELEPHONE (OUTPATIENT)
Dept: PHARMACY | Facility: HOSPITAL | Age: 80
End: 2025-02-13
Payer: MEDICARE

## 2025-02-13 NOTE — TELEPHONE ENCOUNTER
Population Health: Outreach by Ambulatory Pharmacy Team    Patient: Abdirahman GUTIÉRREZ Fabygeoff  Primary Care Provider (PCP): Eduard Melchor DO  Payor: Zenia MEZA  Reason: Adherence  Medication(s): Pravastatin  Outcome: Left Voicemail    Yury Ramirez, PharmD    Resident Pharmacist

## 2025-03-31 ENCOUNTER — TELEPHONE (OUTPATIENT)
Dept: PRIMARY CARE | Facility: CLINIC | Age: 80
End: 2025-03-31
Payer: MEDICARE

## 2025-03-31 NOTE — TELEPHONE ENCOUNTER
Abdirahman called to reschedule appt due to still seeing a doctor that he sees every Wednesday. I mailed his labs to have done in [facility] lab before his next appt.

## 2025-04-09 ENCOUNTER — APPOINTMENT (OUTPATIENT)
Dept: PRIMARY CARE | Facility: CLINIC | Age: 80
End: 2025-04-09
Payer: MEDICARE

## 2025-05-05 DIAGNOSIS — I10 PRIMARY HYPERTENSION: ICD-10-CM

## 2025-05-05 RX ORDER — RAMIPRIL 10 MG/1
10 CAPSULE ORAL DAILY
Qty: 90 CAPSULE | Refills: 3 | Status: SHIPPED | OUTPATIENT
Start: 2025-05-05 | End: 2026-05-05

## 2025-07-07 LAB — HEMOGLOBIN A1C/HEMOGLOBIN TOTAL IN BLOOD EXTERNAL: 6.9 %

## 2025-07-09 NOTE — PROGRESS NOTES
Subjective   Reason for Visit: Abdirahman Mcdaniels is an 79 y.o. male here for a annual  physical exam and to review chronic illnesses      Past Medical, Surgical, and Family History reviewed and updated in chart.    Reviewed all medications by prescribing practitioner or clinical pharmacist (such as prescriptions, OTCs, herbal therapies and supplements) and documented in the medical record.    HPI  Reviewed chronic medical conditions    History of Present Illness  The patient presents for evaluation of diabetes, chronic leg ulcers, and hypertension.    He reports no symptoms of excessive thirst or urination, dizziness, or headaches. His sleep is undisturbed. Dietary changes include eliminating pizza since 10/2024 and reducing his intake of spaghetti and pasta. He has lost 4 pounds and was hoping that it would cut down on his blood sugar. He maintains a regular walking routine. He is not experiencing any gastrointestinal issues such as diarrhea or constipation. He is not experiencing any cold sensation in his feet and believes his nerve function is normal. He continues to take vitamin B12 and D3 supplements daily. He is not using tobacco, alcohol, or marijuana. He has not had any recent surgeries. He is not experiencing any depression or falls at home. He is trying to eat a well-balanced diet and exercise. He is trying to walk as much as he can. He is able to do everything on his own. He needs to start following up with the dentist again. He is not experiencing any problems with swallowing or pain with swallowing. He feels good and does not have any aches or pains. He sleeps in a lift chair and has not been in a bed for 2 years. He finds the chair very comfortable and does not have any back pain. He has been on heart medication since 1983, prescribed by Dr. Owens.    He has noticed swelling in his legs, which he believes is due to the outermost layer of skin not sealing properly. The diuretic medication has been  "beneficial in managing this condition, and he no longer experiences water blisters. He recently had his toenails trimmed by a podiatrist.    His blood pressure readings have been within the normal range. He is not experiencing any chest pain, palpitations, or shortness of breath.    His hip condition has improved with medication, but he still experiences occasional shoulder discomfort. His knees remain problematic, and he struggles with balance when standing up. He has stopped mowing the lawn and carrying heavy objects on his shoulders. He now wears suspenders instead of a belt.    SOCIAL HISTORY  He does not smoke, drink alcohol, or use marijuana.     Results  Labs   - Hemoglobin A1c: 6.9%     Orders Only on 07/07/2025   Component Date Value    Hemoglobin A1C External 07/03/2025 6.9         Diet: He is not following any paticular exercise.    Exercise: trying to walk some  Sleep:  He is sleeping well  Denies urinary difficulty    Dental: No issues and regular  Vision:  No problems   Hearing is decreased some    Saw Dr Lentz saw him.    He is wearing compression stockings.    Had been to [facility] in June and legs swelled up.  Having them wrapped.   Furosemide has helped significantly     Patient Care Team:  Eduard Melchor DO as PCP - General  Eduard Melchor DO as PCP - [employer] PCP     Review of Systems  Deneis chest pain, orthopnea.  SOB.  He is slowq going up steps.   Objective   Vitals:  /64 (BP Location: Right arm, Patient Position: Sitting, BP Cuff Size: Adult)   Pulse 57   Temp 36.4 °C (97.5 °F)   Ht 1.753 m (5' 9\")   Wt 116 kg (255 lb)   SpO2 95%   BMI 37.66 kg/m²       Physical Exam  General: Patient is alert and oriented ×3 and appears in no acute distress. No respiratory distress. Obese    Head: Atraumatic normocephalic.    Eyes: EOMI, PERRLA      Ears: Canals patent without any irritation, tympanic membranes without inflammation, no swelling, normal light reflex.    Neck: " Normal range of motion, no masses.  Thyroid is palpable and normal in size without any nodules. No anterior cervical or posterior cervical adenopathy.    Heart: Regular rate and rhythm, no murmurs clicks or gallops.  Dependent edema bilaterally    Lungs: Clear to auscultation bilaterally without any rhonchi rales or wheezing, lung sounds heard throughout all lung fields    Abdomen: Soft, nontender, no rigidity, rebound, guarding or organomegaly. Bowel sounds ×4 quadrants.    Musculoskeletal: Normal range of motion, strength is grossly intact in the proximal distal muscles of the upper and lower extremities bilaterally, deep tendon reflexes +2 out of 4 and symmetric bilaterally at the patella, Achilles, biceps, triceps, sensation intact.    Nerves: Cranial nerves II through XII appear grossly intact and without deficit    Skin: Intact, dry, no rashes or erythema.  Wound is healing    Psych: Normal affect.     Assessment/Plan   Problem List Items Addressed This Visit       Borderline hyperlipidemia    Hypertension    Prediabetes    Relevant Medications    metFORMIN (Glucophage) 500 mg tablet    Vitamin B 12 deficiency    Medicare annual wellness visit, subsequent - Primary    Class 2 severe obesity due to excess calories with serious comorbidity and body mass index (BMI) of 36.0 to 36.9 in adult    Chronic ulcer of left leg with fat layer exposed (Multi)    Moderate protein-calorie malnutrition (Multi)    Overview   Noted by [facility]  last documented on [id number]          Other Visit Diagnoses         Vitamin D deficiency        Relevant Orders    Vitamin D 25-Hydroxy,Total (for eval of Vitamin D levels)      Type 2 diabetes mellitus without complication, without long-term current use of insulin              Assessment & Plan  1. Diabetes mellitus.  - Hemoglobin A1c level has increased to 6.9, indicating a transition from prediabetes to diabetes.  - No symptoms of dizziness, headaches, excessive  thirst, or frequent urination reported.  - The potential complications of untreated diabetes, including cardiovascular disease, neuropathy, retinopathy, nephropathy, and increased infection risk, were discussed.  - Prescription for metformin 500 mg twice daily provided; urine sample to be collected for further analysis.    2. Chronic leg ulcers.  - Chronic ulcers are showing signs of healing.  - Physical exam reveals no water blisters; patient reports improvement with the use of water pills.  - Importance of maintaining a high-protein diet emphasized; patient consumes eggs and vegetables regularly.  - Advised to continue leg wrapping and regular podiatry visits for foot care.    3. Hypertension.  - Blood pressure readings are within the normal range.  - No symptoms of chest pain, palpitations, or shortness of breath reported.  - Currently on furosemide, metoprolol, pravastatin, and ramipril; no changes in medication regimen.  - Patient advised to maintain a well-balanced diet and regular exercise.    4. General health maintenance.  - No recent surgeries, tobacco, alcohol, or marijuana use.  - Regular dental visits encouraged; no issues with swallowing or pain reported.  - Patient sleeps well in a lift chair, which alleviates back pain.  - Follow-up scheduled for August for a flu shot.     Depression screening was done in the office today using PHQ-2    Essential hypertension-currently controlled. continue on decreased metoprolol 50 mg 1 by mouth twice a day and ramipril 10 mg 1 by mouth daily.     Hyperlipidemia-currently controlled. continue on pravastatin 80 mg daily    Diabetes Type 2 new onset   Repeat sugars in 6 months.    HBA1C was 6.9.    Discussed diet and exercise    Osteoarthritis of the hips bilaterally-patient has seen Dr. Duenas the orthopedist.  Does not want knee replacements    Vitamin B-12 deficiency-the patient is taking B-12 regularly.    Morbid obesity/ BMI 37  Discussed weight loss and  diet    Peripheral edema  - Following with podiatry  - Echocardiogram   - Lasix 20 mg daily     **Patient Discussion/Summary    Risk Factors Identified During Visit: Weight: BMI < 18.5 or > 25.   Weight Concerns: provided diet and exercise counseling.   Influenza: influenza vaccine given  Pneumovax 23: Pneumovax 23 vaccine was given  Prevnar 13: Prevnar 13 vaccine was previously given.   Shingles Vaccine: Shingles vaccine was previously given.   Prostate cancer screening: screening not indicated.   Colorectal Cancer Screening: screening not indicated.   Abdominal Aortic Aneurysm screening: screening not indicated.   HIV screening: screening not indicated.

## 2025-07-10 ENCOUNTER — APPOINTMENT (OUTPATIENT)
Dept: PRIMARY CARE | Facility: CLINIC | Age: 80
End: 2025-07-10
Payer: MEDICARE

## 2025-07-10 VITALS
DIASTOLIC BLOOD PRESSURE: 64 MMHG | HEIGHT: 69 IN | BODY MASS INDEX: 37.77 KG/M2 | TEMPERATURE: 97.5 F | SYSTOLIC BLOOD PRESSURE: 106 MMHG | HEART RATE: 57 BPM | OXYGEN SATURATION: 95 % | WEIGHT: 255 LBS

## 2025-07-10 DIAGNOSIS — E66.01 CLASS 2 SEVERE OBESITY DUE TO EXCESS CALORIES WITH SERIOUS COMORBIDITY AND BODY MASS INDEX (BMI) OF 36.0 TO 36.9 IN ADULT: ICD-10-CM

## 2025-07-10 DIAGNOSIS — E44.0 MODERATE PROTEIN-CALORIE MALNUTRITION (MULTI): ICD-10-CM

## 2025-07-10 DIAGNOSIS — E53.8 VITAMIN B 12 DEFICIENCY: ICD-10-CM

## 2025-07-10 DIAGNOSIS — E11.9 TYPE 2 DIABETES MELLITUS WITHOUT COMPLICATION, WITHOUT LONG-TERM CURRENT USE OF INSULIN: ICD-10-CM

## 2025-07-10 DIAGNOSIS — I10 PRIMARY HYPERTENSION: ICD-10-CM

## 2025-07-10 DIAGNOSIS — E11.9 NEW ONSET TYPE 2 DIABETES MELLITUS (MULTI): ICD-10-CM

## 2025-07-10 DIAGNOSIS — E78.5 BORDERLINE HYPERLIPIDEMIA: ICD-10-CM

## 2025-07-10 DIAGNOSIS — Z00.00 MEDICARE ANNUAL WELLNESS VISIT, SUBSEQUENT: Primary | ICD-10-CM

## 2025-07-10 DIAGNOSIS — L97.922 CHRONIC ULCER OF LEFT LEG WITH FAT LAYER EXPOSED (MULTI): ICD-10-CM

## 2025-07-10 DIAGNOSIS — E66.812 CLASS 2 SEVERE OBESITY DUE TO EXCESS CALORIES WITH SERIOUS COMORBIDITY AND BODY MASS INDEX (BMI) OF 36.0 TO 36.9 IN ADULT: ICD-10-CM

## 2025-07-10 DIAGNOSIS — E55.9 VITAMIN D DEFICIENCY: ICD-10-CM

## 2025-07-10 PROBLEM — M79.674 PAIN IN TOE OF RIGHT FOOT: Status: ACTIVE | Noted: 2025-01-29

## 2025-07-10 PROBLEM — B35.1 ONYCHOMYCOSIS: Status: ACTIVE | Noted: 2025-01-29

## 2025-07-10 PROBLEM — M79.675 PAIN IN TOE OF LEFT FOOT: Status: ACTIVE | Noted: 2025-01-29

## 2025-07-10 PROCEDURE — 1170F FXNL STATUS ASSESSED: CPT | Performed by: FAMILY MEDICINE

## 2025-07-10 PROCEDURE — 1159F MED LIST DOCD IN RCRD: CPT | Performed by: FAMILY MEDICINE

## 2025-07-10 PROCEDURE — 3074F SYST BP LT 130 MM HG: CPT | Performed by: FAMILY MEDICINE

## 2025-07-10 PROCEDURE — 1158F ADVNC CARE PLAN TLK DOCD: CPT | Performed by: FAMILY MEDICINE

## 2025-07-10 PROCEDURE — G0439 PPPS, SUBSEQ VISIT: HCPCS | Performed by: FAMILY MEDICINE

## 2025-07-10 PROCEDURE — 1036F TOBACCO NON-USER: CPT | Performed by: FAMILY MEDICINE

## 2025-07-10 PROCEDURE — 3078F DIAST BP <80 MM HG: CPT | Performed by: FAMILY MEDICINE

## 2025-07-10 PROCEDURE — 99214 OFFICE O/P EST MOD 30 MIN: CPT | Performed by: FAMILY MEDICINE

## 2025-07-10 PROCEDURE — 1123F ACP DISCUSS/DSCN MKR DOCD: CPT | Performed by: FAMILY MEDICINE

## 2025-07-10 PROCEDURE — 1160F RVW MEDS BY RX/DR IN RCRD: CPT | Performed by: FAMILY MEDICINE

## 2025-07-10 RX ORDER — METFORMIN HYDROCHLORIDE 500 MG/1
500 TABLET ORAL
Qty: 200 TABLET | Refills: 3 | Status: SHIPPED | OUTPATIENT
Start: 2025-07-10 | End: 2026-08-14

## 2025-07-10 ASSESSMENT — ACTIVITIES OF DAILY LIVING (ADL)
TAKING_MEDICATION: INDEPENDENT
DRESSING: INDEPENDENT
DOING_HOUSEWORK: INDEPENDENT
BATHING: INDEPENDENT
GROCERY_SHOPPING: INDEPENDENT
MANAGING_FINANCES: INDEPENDENT

## 2025-07-16 LAB — ALBUMIN/CREATININE (UG/MG) IN URINE EXTERNAL: 29 UG/MG CREAT

## 2026-01-08 ENCOUNTER — APPOINTMENT (OUTPATIENT)
Dept: PRIMARY CARE | Facility: CLINIC | Age: 81
End: 2026-01-08
Payer: MEDICARE